# Patient Record
Sex: MALE | Race: WHITE | Employment: FULL TIME | URBAN - NONMETROPOLITAN AREA
[De-identification: names, ages, dates, MRNs, and addresses within clinical notes are randomized per-mention and may not be internally consistent; named-entity substitution may affect disease eponyms.]

---

## 2019-05-10 ENCOUNTER — HOSPITAL ENCOUNTER (OUTPATIENT)
Dept: PHYSICAL THERAPY | Age: 44
Setting detail: THERAPIES SERIES
Discharge: HOME | End: 2019-05-10
Payer: COMMERCIAL

## 2019-05-10 PROCEDURE — 97140 MANUAL THERAPY 1/> REGIONS: CPT

## 2019-05-10 PROCEDURE — 97110 THERAPEUTIC EXERCISES: CPT

## 2019-05-10 PROCEDURE — 97161 PT EVAL LOW COMPLEX 20 MIN: CPT

## 2019-05-10 ASSESSMENT — PAIN SCALES - QUEBEC BACK PAIN DISABILITY SCALE
TURN OVER IN BED: 2
STAND UP FOR 20 TO 30 MINUTES: 3
BEND OVER TO CLEAN THE BATHTUB: 4
SIT IN A CHAIR FOR SEVERAL HOURS: 3
MAKE YOUR BED: 4
QUEBEC CMS MODIFIER: CK
CLIMB ONE FLIGHT OF STAIRS: 2
MOVE A CHAIR: 3
LIFT AND CARRY A HEAVY SUITCASE: 4
TOTAL SCORE: 58
PULL OR PUSH HEAVY DOORS: 3
QUEBEC DISABILITY INDEX: 40-59%
THROW A BALL: 4
WALK A FEW BLOCKS OR 300 TO 400M: 2
SLEEP THROUGH THE NIGHT: 0
CARRY TWO BAGS OF GROCERIES: 3
PUT ON SOCKS OR PANYHOSE: 3
REACH UP TO HIGH SHELVES: 2
TAKE FOOD OUT OF THE REFRIGERATOR: 3
WALK SEVERAL KILOMETERS  OR MILES: 4
GET OUT OF BED: 2
RIDE IN A CAR: 2
RUN ONE BLOCK OR 100M: 5

## 2019-05-10 NOTE — PROGRESS NOTES
Phone: [address] [facility]          Fax: [phone]                      Outpatient Physical Therapy                                                                      Evaluation  Date: 5/10/2019  Patient: Sarah Spangler  : 1975  CSN #: [id number]  Referring Practitioner: Dr. Barb Black MD    Referral Date : 19     Diagnosis: Acute midline low back pain with bilateral sciatica, M54.42, M54.41    Treatment Diagnosis: Low back pain  Onset Date: 19  PT Insurance Information: BCBS  Total # of Visits Approved: 12   Total # of Visits to Date: 1  No Show: 0  Canceled Appointment: 0     Subjective  Subjective: Pt. reports he stood up Tuesday morning and had back pain with shooting pain on both sides of the hips 6/10 at worst. Pt. reports ibuprofen did not help with the pain but muscle relaxer helped. Pt. reports he has to do a lot of walking and stairs but no heavy lifting at work and is off work until 19. Comments: Expected RTW: 19. Additional Pertinent Hx: depression             Objective     Observation/Palpation  Posture: Fair  Palpation: TTP L4-5 spinous process and L lumbar paraspinals and L piriformis  Observation: Pt. stands with increased lumbar lordosis and decreased thoracic kyphosis     RLE General PROM: Hip IR: 45* no pain  LLE General PROM: Hip IR: 35* with pain    Spine  Lumbar: flexion: able to touch toes with pain on return; L SB: 2in from knee with pain, RSB: able with no pain           Strength RLE  Comment: 4+/5 hip flexion/abd/add; 5/5 knee flex/ext; ankle DF: 5/5  Strength LLE  Comment: 4+/5 hip flexion/abd/add; 5/5 knee flex/ext; ankle DF: 5/5  Strength Other  Other: Abdominal strength: 4/5    Additional Measures  Special Tests: (+) B slump test; (-) B DEBO and SLR test; limited B HS mobility SLR: 75* with pain; good pelvic alignment;      Functional Outcome Measures  Get out of bed: Somewhat difficult  Sleep through the night: Not difficult at all  Turn over in bed: Somewhat difficult  Ride in a car: Somewhat difficult  Stand up for 20-30 minutes: Fairly difficult  Sit in a chair for several hours: Fairly difficult  Climb one flight of stairs: Somewhat difficult  Walk a few blocks (300-400 m): Somewhat difficult  Walk several kilometers - miles: Very difficult  Reach up to high shelves: Somewhat difficult  Throw a ball: Very difficult  Run one block (about 100 m): Unable to do  Take food out of the refrigerator: Fairly difficult  Make your bed: Very difficult  Put on socks (pantyhose): Fairly difficult  Bend over to clean the bathtub: Very difficult  Move a chair: Fairly difficult  Pull or push heavy doors: Fairly difficult  Carry two bags of groceries: Fairly difficult  Lift and carry a heavy suitcase: Very difficult  Karis Total Score: 58      Assessment  Assessment: Pt. is 38year old male with dx of acute midline low back pain who presents with increased pain 6/10 at worst with occasional sharp shooting pain with extension, LSB and standing up from sitting. Pt. stands with increased lumbar lordosis and decreased thoracic kyphosis. TTP L4-5 spinous process and L lumbar paraspinals and L piriformis. Pt. has decreased L hip IR: 35* with pain and decreased lumbar AROM: flexion: able to touch toes with pain on return; L SB: 2in from knee with pain, RSB: able with no pain. Pt. has decreased abdominal strength: 4/5 and decreased B hip strength 4+/5. (+) B slump test; (-) B DEBO and SLR test; limited B HS mobility SLR: 75* with pain; good pelvic alignment. Pt. to benefit from physical therapy to improve lumbar AROM and L hip ROM, increased abdominal strength and improve posture to decrease pain and return to PLOF.    Prognosis: Good        Decision Making: Low Complexity    Patient Education  Patient Education: PT eval, POC, HEP  Pt verbalized/demonstrated good understanding:     [X] Yes         [] No, pt required further clarification. Goals  Short term goals  Time Frame for Short term goals: 3 weeks  Short term goal 1: Pt. to initiate HEP for improved core strength and lumbar mobility  Short term goal 2: Initiate modalities prn to decrease pain and improve mobility  Short term goal 3: Pt. to have improved L hip IR to 45* and be (-)TTP at L piriformis for improved mobility. Long term goals  Time Frame for Long term goals : 6 weeks  Long term goal 1: Pt. to be independent with HEP  Long term goal 2: Pt. to demo improved core strength >/=4+/5 and improved B hip strength to 5/5 for improved functional strength. Long term goal 3: Pt. to demo improved lumbar AROM: LSB able to reach knee joint line with no pain and flexion able to touch floor with no pain to improve functional mobility      Patient goals :  \"To fix the issue\"        Minutes Tracking:  Time In: 1018  Time Out: 1104  Minutes: [address], PT, DPT   5/10/2019

## 2019-05-10 NOTE — PLAN OF CARE
PT/OT INITIAL EVALUATION REPORT   Todays Date: 5/10/2019                PT Insurance Information: [employer]  Patient Name: Robert Mi                                  Patient ID #: _______________   YOB: 1975 Age: 38 y.o. ICD-10 Code with Diagnosis: Acute midline low back pain with bilateral sciatica, M54.42, M54.41  Referring Practitioner: Dr. Karlie Cardenas MD  Referring Physician ID #:_______________     Therapy Office: [address]          Discipline: []PT  []OT     Condition Type: [x] Acute(< 2 months)  [] Sub-acute(2-3 months)  [] Chronic(>3 months)    Onset:  [x] Insidious/No Trauma   [] Traumatic Injury   [] Repetitive Stress               [] Post-Operative (Type: Date:)  [] Work-related   [] Motor Vehicle     How / Where Injury Occurred: Subjective: Pt. reports he stood up Tuesday morning and had back pain with shooting pain on both sides of the hips 6/10 at worst. Pt. reports ibuprofen did not help with the pain but muscle relaxer helped. Pt. reports he has to do a lot of walking and stairs but no heavy lifting at work and is off work until 5/20/19.       Pertinent History: Additional Pertinent Hx: depression    Pain:  Pain Scale:  6 /10     Nature: [] constant   [x] intermittent   [x] localized   [] radiating     OBJECTIVE FINDINGS    Involved Region: [x]Left / []Right / []N/A     AROM Limitations: [] None   [] Minimal   [x] Moderate   [] Significant    Strength ( 0-5 )           Strength RLE  Comment: 4+/5 hip flexion/abd/add; 5/5 knee flex/ext; ankle DF: 5/5      Strength LLE  Comment: 4+/5 hip flexion/abd/add; 5/5 knee flex/ext; ankle DF: 5/5        Strength Other  Other: Abdominal strength: 4/5    Functional Limitations:  [] None   [] Minimal   [x] Moderate   [] Significant    Additional Information: Assessment: Pt. is 38year old male with dx of acute midline low back pain who presents with increased pain 6/10 at worst with occasional sharp shooting pain with extension, LSB and standing up from sitting. Pt. stands with increased lumbar lordosis and decreased thoracic kyphosis. TTP L4-5 spinous process and L lumbar paraspinals and L piriformis. Pt. has decreased L hip IR: 35* with pain and decreased lumbar AROM: flexion: able to touch toes with pain on return; L SB: 2in from knee with pain, RSB: able with no pain. Pt. has decreased abdominal strength: 4/5 and decreased B hip strength 4+/5. (+) B slump test; (-) B DEBO and SLR test; limited B HS mobility SLR: 75* with pain; good pelvic alignment. Pt. to benefit from physical therapy to improve lumbar AROM and L hip ROM, increased abdominal strength and improve posture to decrease pain and return to PLOF. Functional Measure Score: Form Type: [] Neck    [] Back   [] SF 12/36   [] LEFS   [] DASH   [] KSS    [x] Other: [place]: 58    Specific Treatment Plan:  [x]HP/CP      [x]Electrical Stim   [x]Therapeutic Exercise      []Gait Training  []Aquatics   []Ultrasound         [x]Patient Education/HEP   [x]Manual Therapy  [x]Traction    [x]Neuro-susie        [x]Soft Tissue Mobs            []Home TENS  []Iontophoresis    []Orthotic casting/fitting      []Dry Needling       Treatment Goals:   Short term goals  Time Frame for Short term goals: 3 weeks  Short term goal 1: Pt. to initiate HEP for improved core strength and lumbar mobility  Short term goal 2: Initiate modalities prn to decrease pain and improve mobility  Short term goal 3: Pt. to have improved L hip IR to 45* and be (-)TTP at L piriformis for improved mobility. Long term goals  Time Frame for Long term goals : 6 weeks  Long term goal 1: Pt. to be independent with HEP  Long term goal 2: Pt. to demo improved core strength >/=4+/5 and improved B hip strength to 5/5 for improved functional strength.    Long term goal 3: Pt. to demo improved lumbar AROM: LSB able to reach knee joint line with no pain and flexion able to touch floor with no pain to improve functional mobility    Projected Frequency / Duration of Treatment   Times per week: 2-3  Plan weeks: 4    Progress since first visit:   [x] None, 1st visit    [] No Progress Yet    [] Some Progress     [] Significant Progress    [] Significantly Worse     [] Extension of Services     Therapist Signature:           Printed Therapist Name and License #:Ritesh Donato, PT, DPT    Copyright 2018 [employer]      Created: 9/99 / Revised: 04/2018

## 2019-05-10 NOTE — PLAN OF CARE
mobility. Long term goals  Time Frame for Long term goals : 6 weeks  Long term goal 1: Pt. to be independent with HEP  Long term goal 2: Pt. to demo improved core strength >/=4+/5 and improved B hip strength to 5/5 for improved functional strength.    Long term goal 3: Pt. to demo improved lumbar AROM: LSB able to reach knee joint line with no pain and flexion able to touch floor with no pain to improve functional mobility     Prognosis  Prognosis: Good    Treatment Plan   Times per week: 2-3  Plan weeks: 4  [x] HP/CP      [x] Electrical Stim   [x] Therapeutic Exercise      [] Gait Training  [] Aquatics   [] Ultrasound         [x] Patient Education/HEP   [x] Manual Therapy  [x] Traction    [x] Neuro-susie        [x] Soft Tissue Mobs            [] Home TENS  [] Iontophoresis    [] Orthotic casting/fitting      [] Dry Needling             Electronically signed by: Roni Pearson PT, DPT    Date: 5/10/2019      ______________________________________ Date: 5/10/2019   Physician Signature

## 2019-05-16 ENCOUNTER — HOSPITAL ENCOUNTER (OUTPATIENT)
Dept: PHYSICAL THERAPY | Age: 44
Setting detail: THERAPIES SERIES
Discharge: HOME | End: 2019-05-16
Payer: COMMERCIAL

## 2019-05-16 PROCEDURE — G0283 ELEC STIM OTHER THAN WOUND: HCPCS

## 2019-05-16 PROCEDURE — 97110 THERAPEUTIC EXERCISES: CPT

## 2019-05-16 NOTE — PROGRESS NOTES
Phone: [facility]           Fax: [phone]                           Outpatient Physical Therapy                                                                            Daily Note    Patient: Telma Mariee : 1975  CSN #: [id number]   Referring Practitioner:  Dr. Francisco Sarabia MD    Referral Date : 19     Date: 2019    Diagnosis: Acute midline low back pain with bilateral sciatica, M54.42, M54.41  Treatment Diagnosis: Low back pain    Onset Date: 19  PT Insurance Information: BCBS  Total # of Visits Approved: 12 Per Physician Order  Total # of Visits to Date: 2  No Show: 0  Canceled Appointment: 0      Pre-Treatment Pain:  4/10  Subjective: States pain has decreased. Reports 4/10 pain. Exercises:  Exercise 1: HEP: LTR, SKTC, DKTC, PPT, bridges, supine and sitting piriformis stretches  Modalities:  Moist heat: 15 min with ifc   E-stim (parameters): ifc for pain        Assessment  Assessment: Feelsing some better. Patient with pain during left SB and some mild pain coming out of flexion. LB \"catches\" wt time while performing a sit to stand transfer. Flexion based exercise provide relief. Had pain with stiff leg swiss ball bridging. Added ifc/hp post exercise     Patient Education  Patient Education: HEP and LB precautions   Pt verbalized/demonstrated good understanding:     [x] Yes         [] No, pt required further clarification.     Post Treatment Pain:  4/10      Plan  Times per week: 2-3  Plan weeks: 4      Goals  (Total # of Visits to Date: 2)   Short Term Goals - Time Frame for Short term goals: 3 weeks     Short term goal 1: Pt. to initiate HEP for improved core strength and lumbar mobility                                        []Met   []Partially met  []Not met   Short term goal 2: Initiate modalities prn to decrease pain and improve mobility  []Met   []Partially met  []Not met   Short term goal 3: Pt. to have improved L hip IR to 45* and be (-)TTP at L piriformis for improved mobility. []Met  []Partially met  []Not met      []Met   []Partially met  []Not met     Long Term Goals - Time Frame for Long term goals : 6 weeks  Long term goal 1: Pt. to be independent with HEP []Met  []Partially met  []Not met   Long term goal 2: Pt. to demo improved core strength >/=4+/5 and improved B hip strength to 5/5 for improved functional strength.   []Met  []Partially met  []Not met   Long term goal 3: Pt. to demo improved lumbar AROM: LSB able to reach knee joint line with no pain and flexion able to touch floor with no pain to improve functional mobility []Met  []Partially met  []Not met     []Met  []Partially met  []Not met     []Met  []Partially met  []Not met       Minutes Tracking:  Time In: 1700  Time Out: [address]  Minutes: 55    Omari German PTA     Date: 5/16/2019

## 2019-05-17 ENCOUNTER — HOSPITAL ENCOUNTER (OUTPATIENT)
Dept: PHYSICAL THERAPY | Age: 44
Setting detail: THERAPIES SERIES
Discharge: HOME | End: 2019-05-17
Payer: COMMERCIAL

## 2019-05-17 PROCEDURE — G0283 ELEC STIM OTHER THAN WOUND: HCPCS

## 2019-05-17 PROCEDURE — 97110 THERAPEUTIC EXERCISES: CPT

## 2019-05-17 PROCEDURE — 97140 MANUAL THERAPY 1/> REGIONS: CPT

## 2019-05-17 NOTE — PROGRESS NOTES
Phone: [facility]           Fax: [phone]                           Outpatient Physical Therapy                                                                            Daily Note    Patient: Robert Mi : 1975  CSN #: [id number]   Referring Practitioner:  Dr. Karlie Cardenas MD    Referral Date : 19     Date: 2019    Diagnosis: Acute midline low back pain with bilateral sciatica, M54.42, M54.41  Treatment Diagnosis: Low back pain    Onset Date: 19  PT Insurance Information: BCBS  Total # of Visits Approved: 12 Per Physician Order  Total # of Visits to Date: 3  No Show: 0  Canceled Appointment: 0      Pre-Treatment Pain:  3/10  Subjective: Pt states pain level 3/10 upon arrival to therapy this date. Pt states he is more sore from yesterday's treatment. Exercises:  Exercise 1: HEP: LTR, SKTC, DKTC, PPT, bridges, supine and sitting piriformis stretches    Manual:  Muscle energy: MET for L posterior rotation  Soft Tissue Mobalization: Thermoprobe L low back/SIJ    Modalities:  Moist heat: 15 min with ifc   E-stim (parameters): ifc for pain        Assessment  Assessment: Pt demonstrates L posterior rotation this date, tested with supine to long sit test. MET performed for correction. Thermoprobe performed to L-side SIJ/low back to assist wtih decreasing pain; however, pt very TTP in L PSIS and L superior SIJ, which limits amount of pressure placed for STM. Reviewed HEP this date. Will cont to progress as tolerated. Patient Education  Educated on MET for pelvic alignment. Pt verbalized/demonstrated good understanding:     [x] Yes         [] No, pt required further clarification.     Post Treatment Pain:  3/10      Plan  Times per week: 2-3  Plan weeks: 4      Goals  (Total # of Visits to Date: 3)   Short Term Goals - Time Frame for Short term goals: 3 weeks     Short term goal 1: Pt. to initiate HEP for improved core strength and lumbar mobility- met                                        []Met   []Partially met  []Not met   Short term goal 2: Initiate modalities prn to decrease pain and improve mobility- met  []Met   []Partially met  []Not met   Short term goal 3: Pt. to have improved L hip IR to 45* and be (-)TTP at L piriformis for improved mobility. []Met   []Partially met  []Not met      []Met   []Partially met  []Not met     Long Term Goals - Time Frame for Long term goals : 6 weeks  Long term goal 1: Pt. to be independent with HEP []Met  []Partially met  []Not met   Long term goal 2: Pt. to demo improved core strength >/=4+/5 and improved B hip strength to 5/5 for improved functional strength.   []Met  []Partially met  []Not met   Long term goal 3: Pt. to demo improved lumbar AROM: LSB able to reach knee joint line with no pain and flexion able to touch floor with no pain to improve functional mobility []Met  []Partially met  []Not met     []Met  []Partially met  []Not met     []Met  []Partially met  []Not met       Minutes Tracking:  Time In: 0920  Time Out: 1020  Minutes: 60    Cordelia Rubio, DPT      Date: 5/17/2019

## 2019-05-21 ENCOUNTER — HOSPITAL ENCOUNTER (OUTPATIENT)
Dept: PHYSICAL THERAPY | Age: 44
Setting detail: THERAPIES SERIES
Discharge: HOME | End: 2019-05-21
Payer: COMMERCIAL

## 2019-05-21 PROCEDURE — 97032 APPL MODALITY 1+ESTIM EA 15: CPT

## 2019-05-21 PROCEDURE — 97140 MANUAL THERAPY 1/> REGIONS: CPT

## 2019-05-21 PROCEDURE — 97110 THERAPEUTIC EXERCISES: CPT

## 2019-05-21 NOTE — PROGRESS NOTES
Phone: [facility]           Fax: [phone]                           Outpatient Physical Therapy                                                                            Daily Note    Patient: Telma Mariee : 1975  CSN #: [id number]   Referring Practitioner:  Dr. Francisco Sarabia MD    Referral Date : 19     Date: 2019    Diagnosis: Acute midline low back pain with bilateral sciatica, M54.42, M54.41  Treatment Diagnosis: Low back pain    Onset Date: 19  PT Insurance Information: BCBS  Total # of Visits Approved: 12 Per Physician Order  Total # of Visits to Date: 4  No Show: 0  Canceled Appointment: 0      Pre-Treatment Pain:  3/10  Subjective: States pain is about the same . A little less catching with sit to standing transfer     Exercises:  Exercise 1: HEP: LTR, SKTC, DKTC, PPT, bridges, supine and sitting piriformis stretches    Manual:  Muscle energy: MET for L posterior rotation  Soft Tissue Mobalization: Thermoprobe L low back/SIJ    Modalities:  Moist heat: 15 min with ifc   E-stim (parameters): ifc for pain        Assessment  Assessment: ME to correct left post roration of illium. Thermoprobe tolerated a little better today. Core ex and hep reviewed. Patient Education  Patient Education: HEP and LB precautions   Pt verbalized/demonstrated good understanding:     [x] Yes         [] No, pt required further clarification.     Post Treatment Pain:  /10      Plan  Times per week: 2-3  Plan weeks: 4      Goals  (Total # of Visits to Date: 4)   Short Term Goals - Time Frame for Short term goals: 3 weeks     Short term goal 1: Pt. to initiate HEP for improved core strength and lumbar mobility- met                                        []Met   []Partially met  []Not met   Short term goal 2: Initiate modalities prn to decrease pain and improve mobility- met  []Met   []Partially met  []Not met   Short term goal 3: Pt. to have improved L hip IR to 45* and

## 2019-05-23 ENCOUNTER — HOSPITAL ENCOUNTER (OUTPATIENT)
Dept: PHYSICAL THERAPY | Age: 44
Setting detail: THERAPIES SERIES
Discharge: HOME | End: 2019-05-23
Payer: COMMERCIAL

## 2019-05-23 PROCEDURE — 97110 THERAPEUTIC EXERCISES: CPT

## 2019-05-23 PROCEDURE — G0283 ELEC STIM OTHER THAN WOUND: HCPCS

## 2019-05-23 ASSESSMENT — PAIN SCALES - GENERAL: PAINLEVEL_OUTOF10: 3

## 2019-05-23 NOTE — PROGRESS NOTES
Phone: [facility]           Fax: [phone]                           Outpatient Physical Therapy                                                                            Daily Note    Patient: Carol Whyte : 1975  CSN #: [id number]   Referring Practitioner:  Dr. Diane Michael MD    Referral Date : 19     Date: 2019    Diagnosis: Acute midline low back pain with bilateral sciatica, M54.42, M54.41  Treatment Diagnosis: Low back pain    Onset Date: 19  PT Insurance Information: BCBS  Total # of Visits Approved: 12 Per Physician Order  Total # of Visits to Date: 5  No Show: 0  Canceled Appointment: 0      Pre-Treatment Pain:  3/10  Subjective: Feels  a little better, only took 1 ibupropfen for pain today     Exercises:  Exercise 2: purple ball --DKTC 15x2  Exercise 3: LTR 20x2  Exercise 4: Bridging 20x2   Exercise 5: prone hip extension and knee curls     Manual:       Modalities:  Moist heat: 15 min with ifc   E-stim (parameters): ifc for pain        Assessment  Assessment: Alignment looked god today ME not needed. Progressed to basic core exercise with good tolerance. IFC/hp end of session     Patient Education  Patient Education: HEP and LB precautions   Pt verbalized/demonstrated good understanding:     [x] Yes         [] No, pt required further clarification. Post Treatment Pain:  3/10      Plan  Times per week: 2-3         Goals  (Total # of Visits to Date: 5)   Short Term Goals - Time Frame for Short term goals: 3 weeks     Short term goal 1: Pt. to initiate HEP for improved core strength and lumbar mobility- met                                        []Met   []Partially met  []Not met   Short term goal 2: Initiate modalities prn to decrease pain and improve mobility- met  []Met   []Partially met  []Not met   Short term goal 3: Pt. to have improved L hip IR to 45* and be (-)TTP at L piriformis for improved mobility.   []Met   []Partially met  []Not met      []Met   []Partially met  []Not met     Long Term Goals - Time Frame for Long term goals : 6 weeks  Long term goal 1: Pt. to be independent with HEP []Met  []Partially met  []Not met   Long term goal 2: Pt. to demo improved core strength >/=4+/5 and improved B hip strength to 5/5 for improved functional strength.   []Met  []Partially met  []Not met   Long term goal 3: Pt. to demo improved lumbar AROM: LSB able to reach knee joint line with no pain and flexion able to touch floor with no pain to improve functional mobility []Met  []Partially met  []Not met     []Met  []Partially met  []Not met     []Met  []Partially met  []Not met       Minutes Tracking:  Time In: 1615  Time Out: Mohit 91  Minutes: 62    Omari German PTA     Date: 5/23/2019

## 2019-05-28 ENCOUNTER — HOSPITAL ENCOUNTER (OUTPATIENT)
Dept: PHYSICAL THERAPY | Age: 44
Setting detail: THERAPIES SERIES
Discharge: HOME | End: 2019-05-28
Payer: COMMERCIAL

## 2019-05-28 PROCEDURE — 97140 MANUAL THERAPY 1/> REGIONS: CPT

## 2019-05-28 PROCEDURE — 97110 THERAPEUTIC EXERCISES: CPT

## 2019-05-28 PROCEDURE — G0283 ELEC STIM OTHER THAN WOUND: HCPCS

## 2019-05-29 NOTE — PROGRESS NOTES
Phone: [facility]           Fax: [phone]                           Outpatient Physical Therapy                                                                            Daily Note    Patient: Jazmine Rodriguez : 1975  CSN #: [id number]   Referring Practitioner:  Dr. Louis Coello MD    Referral Date : 19     Date: 2019    Diagnosis: Acute midline low back pain with bilateral sciatica, M54.42, M54.41  Treatment Diagnosis: Low back pain    Onset Date: 19  PT Insurance Information: BCBS  Total # of Visits Approved: 12 Per Physician Order  Total # of Visits to Date: 6  No Show: 0  Canceled Appointment: 0      Pre-Treatment Pain:  3/10  Subjective: Pt eports he \"really doesnt feel like hes made a ton of improvement so far\". Pt states he continues to get stabbingtype LBP in mid lower back. Pt rates current pain a 3/10. Exercises:  Exercise 1: HEP: LTR, SKTC, DKTC, PPT, bridges, supine and sitting piriformis stretches  Exercise 4: Bridging 20x2   Exercise 5: prone hip extension and knee curls   Exercise 6: KENNY 5 mins/ prone press ups 15x    Manual:  Muscle energy: MET for L posterior rotation  Manual traction: Lumbar with therapy ball in supine (increased pain noted)-gentle today  Soft Tissue Mobalization: Thermoprobe L low back/SIJ    Modalities:  Moist heat: 15 min with ifc   E-stim (parameters): ifc for pain        Assessment  Assessment: Pt required correction today for hip alignment. focused on more ext based ex with no radicular symptoms noted. Will advance as Pt tolerates. Patient Education  Patient Education: New ex rationale. Pt verbalized/demonstrated good understanding:     [x] Yes         [] No, pt required further clarification.     Post Treatment Pain:  3/10      Plan  Times per week: 2-3  Plan weeks: 4      Goals  (Total # of Visits to Date: 6)   Short Term Goals - Time Frame for Short term goals: 3 weeks     Short term goal 1: Pt. to

## 2019-05-30 ENCOUNTER — HOSPITAL ENCOUNTER (OUTPATIENT)
Dept: PHYSICAL THERAPY | Age: 44
Setting detail: THERAPIES SERIES
Discharge: HOME | End: 2019-05-30
Payer: COMMERCIAL

## 2019-05-30 PROCEDURE — 97110 THERAPEUTIC EXERCISES: CPT

## 2019-05-30 PROCEDURE — G0283 ELEC STIM OTHER THAN WOUND: HCPCS

## 2019-05-30 PROCEDURE — 97012 MECHANICAL TRACTION THERAPY: CPT

## 2019-05-30 NOTE — PROGRESS NOTES
Phone: [facility]           Fax: [phone]                           Outpatient Physical Therapy                                                                            Daily Note    Patient: Liam Loco : 1975  CSN #: [id number]   Referring Practitioner:  Dr. Lisy Palomo MD    Referral Date : 19     Date: 2019    Diagnosis: Acute midline low back pain with bilateral sciatica, M54.42, M54.41  Treatment Diagnosis: Low back pain    Onset Date: 19  PT Insurance Information: BCBS  Total # of Visits Approved: 12 Per Physician Order  Total # of Visits to Date: 7  No Show: 0  Canceled Appointment: 0      Pre-Treatment Pain:  4/10  Subjective: Felt a little better following last session. States pain remain 3-4/10    Exercises:  Exercise 5: prone hip extension and knee curls   Exercise 6: KENNY 5 mins/ prone press ups 15x    Modalities:  Moist heat: 15 min with ifc   Lumbar traction: IPT  85/55. 45 sec hold, 15 sec rest.        Assessment  Assessment: Aliognment looks good. Pt with increased pain with right SB>left. Pain not as bad with right post quadrant position. Initiated IPT per PT, pt with fair/good tolerance. Increase pull as tolerated     Patient Education  Patient Education: Addition of IPT  Pt verbalized/demonstrated good understanding:     [x] Yes         [] No, pt required further clarification.     Post Treatment Pain:  3/10      Plan  Times per week: 2-3  Plan weeks: 4      Goals  (Total # of Visits to Date: 7)   Short Term Goals - Time Frame for Short term goals: 3 weeks     Short term goal 1: Pt. to initiate HEP for improved core strength and lumbar mobility- met                                        []Met   []Partially met  []Not met   Short term goal 2: Initiate modalities prn to decrease pain and improve mobility- met  []Met   []Partially met  []Not met   Short term goal 3: Pt. to have improved L hip IR to 45* and be (-)TTP at L piriformis

## 2019-06-04 ENCOUNTER — HOSPITAL ENCOUNTER (OUTPATIENT)
Dept: PHYSICAL THERAPY | Age: 44
Setting detail: THERAPIES SERIES
Discharge: HOME | End: 2019-06-04
Payer: COMMERCIAL

## 2019-06-04 PROCEDURE — 97032 APPL MODALITY 1+ESTIM EA 15: CPT

## 2019-06-04 PROCEDURE — 97110 THERAPEUTIC EXERCISES: CPT

## 2019-06-04 PROCEDURE — 97012 MECHANICAL TRACTION THERAPY: CPT

## 2019-06-04 NOTE — PROGRESS NOTES
Phone: [facility]           Fax: [phone]                           Outpatient Physical Therapy                                                                            Daily Note    Patient: Parminder Ruiz : 1975  CSN #: [id number]   Referring Practitioner:  Dr. Kurt Sterling MD    Referral Date : 19     Date: 2019    Diagnosis: Acute midline low back pain with bilateral sciatica, M54.42, M54.41  Treatment Diagnosis: Low back pain    Onset Date: 19  PT Insurance Information: BCBS  Total # of Visits Approved: 12 Per Physician Order  Total # of Visits to Date: 8  No Show: 0  Canceled Appointment: 0      Pre-Treatment Pain:  3/10  Subjective:  States pain remain 3-4/10 States he felt better following the last traction visit. Reports he had very little pain on monday but got active and pain was back by the evening. Current pain is 3/10     Exercises:  Exercise 5: prone hip extension and knee curls   Exercise 6: KENNY 5 mins/ prone press ups 15x2    Modalities:  Moist heat: 15 min with ifc   E-stim (parameters): ifc for pain   Lumbar traction: IPT  95/55. 45 sec hold, 15 sec rest.        Assessment  Assessment: Prone exercise with no increase in sxs. Progressed traction pull to 95# with good tolerance. Noticed pt was ableMild discomfort continue with right post quadrant test  to transition out of chair to standing position with no pain today. Patient Education  Patient Education: Progression of IPT pull   Pt verbalized/demonstrated good understanding:     [x] Yes         [] No, pt required further clarification.     Post Treatment Pain:  3/10      Plan  Times per week: 2-3  Plan weeks: 4      Goals  (Total # of Visits to Date: 8)   Short Term Goals - Time Frame for Short term goals: 3 weeks     Short term goal 1: Pt. to initiate HEP for improved core strength and lumbar mobility- met                                        []Met   []Partially met  []Not met Short term goal 2: Initiate modalities prn to decrease pain and improve mobility- met  []Met   []Partially met  []Not met   Short term goal 3: Pt. to have improved L hip IR to 45* and be (-)TTP at L piriformis for improved mobility. []Met   []Partially met  []Not met      []Met   []Partially met  []Not met     Long Term Goals - Time Frame for Long term goals : 6 weeks  Long term goal 1: Pt. to be independent with HEP []Met  []Partially met  []Not met   Long term goal 2: Pt. to demo improved core strength >/=4+/5 and improved B hip strength to 5/5 for improved functional strength.   []Met  []Partially met  []Not met   Long term goal 3: Pt. to demo improved lumbar AROM: LSB able to reach knee joint line with no pain and flexion able to touch floor with no pain to improve functional mobility []Met  []Partially met  []Not met     []Met  []Partially met  []Not met     []Met  []Partially met  []Not met       Minutes Tracking:  Time In: 1700  Time Out: 1803  Minutes: 63    Omari Duke     Date: 6/4/2019

## 2019-06-05 ENCOUNTER — HOSPITAL ENCOUNTER (OUTPATIENT)
Dept: MRI IMAGING | Age: 44
Discharge: HOME | End: 2019-06-07
Payer: COMMERCIAL

## 2019-06-05 DIAGNOSIS — M54.42 ACUTE MIDLINE LOW BACK PAIN WITH LEFT-SIDED SCIATICA: ICD-10-CM

## 2019-06-05 PROCEDURE — 72148 MRI LUMBAR SPINE W/O DYE: CPT

## 2019-06-06 ENCOUNTER — HOSPITAL ENCOUNTER (OUTPATIENT)
Dept: PHYSICAL THERAPY | Age: 44
Setting detail: THERAPIES SERIES
Discharge: HOME | End: 2019-06-06
Payer: COMMERCIAL

## 2019-06-06 PROCEDURE — G0283 ELEC STIM OTHER THAN WOUND: HCPCS

## 2019-06-06 PROCEDURE — 97012 MECHANICAL TRACTION THERAPY: CPT

## 2019-06-06 ASSESSMENT — PAIN SCALES - GENERAL: PAINLEVEL_OUTOF10: 3

## 2019-06-06 NOTE — PROGRESS NOTES
Phone: [facility]           Fax: [phone]                           Outpatient Physical Therapy                                                                            Daily Note    Patient: Black Harris : 1975  CSN #: [id number]   Referring Practitioner:  Dr. Mayra Cantor MD    Referral Date : 19     Date: 2019    Diagnosis: Acute midline low back pain with bilateral sciatica, M54.42, M54.41  Treatment Diagnosis: Low back pain    Onset Date: 19  PT Insurance Information: BCBS  Total # of Visits Approved: 12 Per Physician Order  Total # of Visits to Date: 9  No Show: 0  Canceled Appointment: 0      Pre-Treatment Pain:  3/10  Subjective: Had MRI, herniated L5-S1 per report. Reports pain has been decreasing with treatment and traction     Exercises:  Exercise 5: prone hip extension and knee curls   Exercise 6: KENNY 5 mins/ prone press ups 15x2       Modalities:  Moist heat: 15 min with ifc   E-stim (parameters): ifc for pain   Lumbar traction: IPT  100/55. 45 sec hold, 15 sec rest.        Assessment  Assessment: Pain2-3/10, pt states sxs are decreasing with treatment. No radicular sxs reported. Neg myotome. Trunk flexion and extension is slightly guarded. Note less pain with SBing. Pt would like to cont with a few more sessions before he returns to the Dr.    Patient Education  Patient Education: Progression of IPT pull   Pt verbalized/demonstrated good understanding:     [x] Yes         [] No, pt required further clarification.     Post Treatment Pain:  2/10      Plan  Times per week: 2-3         Goals  (Total # of Visits to Date: 5)   Short Term Goals -       Short term goal 1: Pt. to initiate HEP for improved core strength and lumbar mobility- met                                        []Met   []Partially met  []Not met   Short term goal 2: Initiate modalities prn to decrease pain and improve mobility- met  []Met   []Partially met  []Not met   Short term goal 3: Pt. to have improved L hip IR to 45* and be (-)TTP at L piriformis for improved mobility. []Met   []Partially met  []Not met      []Met   []Partially met  []Not met     Long Term Goals - Time Frame for Long term goals : 6 weeks  Long term goal 1: Pt. to be independent with HEP []Met  []Partially met  []Not met   Long term goal 2: Pt. to demo improved core strength >/=4+/5 and improved B hip strength to 5/5 for improved functional strength.   []Met  []Partially met  []Not met   Long term goal 3: Pt. to demo improved lumbar AROM: LSB able to reach knee joint line with no pain and flexion able to touch floor with no pain to improve functional mobility []Met  []Partially met  []Not met     []Met  []Partially met  []Not met     []Met  []Partially met  []Not met       Minutes Tracking:  Time In: 66 91 21  Time Out: Mindy  Minutes: 62    Omari German PTA     Date: 6/6/2019

## 2019-06-11 ENCOUNTER — HOSPITAL ENCOUNTER (OUTPATIENT)
Dept: PHYSICAL THERAPY | Age: 44
Setting detail: THERAPIES SERIES
Discharge: HOME | End: 2019-06-11
Payer: COMMERCIAL

## 2019-06-11 PROCEDURE — 97012 MECHANICAL TRACTION THERAPY: CPT

## 2019-06-11 PROCEDURE — 97110 THERAPEUTIC EXERCISES: CPT

## 2019-06-11 NOTE — PLAN OF CARE
formerly [facility]           Phone: [phone]             Outpatient Physical Therapy  Fax: [phone]                                           Date: 2019  Patient: Stormy Avendaño : 1975 [facility] #: [id number]   Referring Practitioner:  Dr. Ileana Crawford MD Referral Date:  19       [] Plan of Care   [x] Updated Plan of Care    Dates of Service to Include: 2019 to 19    Diagnosis:  Acute midline low back pain with bilateral sciatica, M54.42, M54.41    Rehab (Treatment) Diagnosis:  Low back pain             Onset Date:  19    Attendance  Total # of Visits to Date: 9 No Show: 0 Canceled Appointment: 0    Assessment  Assessment: At last visit, patient had MRI revealing L5-S1 disc bulge with foraminal narrowing. Pt. reports decreased pain with therapy overall and would like to continue with traction treatments. Decreased pain with sidebending and no radicular symptoms reports. Will continue traction and strengthening in order to decrease pain and return to PLOF with reduced risk of reinjury. Goals  Short term goals  Time Frame for Short term goals: 3 weeks  Short term goal 1: Pt. to initiate HEP for improved core strength and lumbar mobility- met  Short term goal 2: Initiate modalities prn to decrease pain and improve mobility- met  Short term goal 3: Pt. to have improved L hip IR to 45* and be (-)TTP at L piriformis for improved mobility. Long term goals  Time Frame for Long term goals : 6 weeks  Long term goal 1: Pt. to be independent with HEP  Long term goal 2: Pt. to demo improved core strength >/=4+/5 and improved B hip strength to 5/5 for improved functional strength.    Long term goal 3: Pt. to demo improved lumbar AROM: LSB able to reach knee joint line with no pain and flexion able to touch floor with no pain to improve functional mobility     Prognosis  Prognosis: Good    Treatment Plan Times per week: 2-3  Plan weeks: 4  [x] HP/CP      [x] Electrical Stim   [x] Therapeutic Exercise      [] Gait Training  [] Aquatics   [] Ultrasound         [x] Patient Education/HEP   [x] Manual Therapy  [x] Traction    [x] Neuro-susie        [x] Soft Tissue Mobs            [] Home TENS  [] Iontophoresis    [] Orthotic casting/fitting      [] Dry Needling             Electronically signed by: Socorro Talley PT, DPT    Date: 6/11/2019      ______________________________________ Date: 6/11/2019   Physician Signature

## 2019-06-11 NOTE — PROGRESS NOTES
Phone: [facility]           Fax: [phone]                           Outpatient Physical Therapy                                                                            Daily Note    Patient: Honey Alonzo : 1975  CSN #: [id number]   Referring Practitioner:  Dr. Lion Dial MD    Referral Date : 19     Date: 2019    Diagnosis: Acute midline low back pain with bilateral sciatica, M54.42, M54.41  Treatment Diagnosis: Low back pain    Onset Date: 19  PT Insurance Information: BCBS  Total # of Visits Approved: 20 Per Physician Order  Total # of Visits to Date: 10  No Show: 0  Canceled Appointment: 0      Pre-Treatment Pain:  /10  Subjective: Pt states he's been feeling alot better the last 4 days. States pain is minimal to none today. Modalities:  Lumbar traction: IPT  100/55. 45 sec hold, 15 sec rest.        Assessment  Assessment: PCont with IPT this session. d/c ifc due to low pain. Pt with near normal trunk ROM following IPT with no sxs reported. Pt feels he can RTW and plans to the  on Friday. Pt has one session left on Thursday for re-eval.     Patient Education  Patient Education: HEP  Pt verbalized/demonstrated good understanding:     [x] Yes         [] No, pt required further clarification. Post Treatment Pain:  1/10      Plan  Times per week: 2-3  Plan weeks: 4      Goals  (Total # of Visits to Date: 10)   Short Term Goals - Time Frame for Short term goals: 3 weeks     Short term goal 1: Pt. to initiate HEP for improved core strength and lumbar mobility- met                                        []Met   []Partially met  []Not met   Short term goal 2: Initiate modalities prn to decrease pain and improve mobility- met  []Met   []Partially met  []Not met   Short term goal 3: Pt. to have improved L hip IR to 45* and be (-)TTP at L piriformis for improved mobility.   []Met   []Partially met  []Not met      []Met   []Partially met  []Not met     Long Term Goals - Time Frame for Long term goals : 6 weeks  Long term goal 1: Pt. to be independent with HEP []Met  []Partially met  []Not met   Long term goal 2: Pt. to demo improved core strength >/=4+/5 and improved B hip strength to 5/5 for improved functional strength.   []Met  []Partially met  []Not met     []Met  []Partially met  []Not met     []Met  []Partially met  []Not met     []Met  []Partially met  []Not met       Minutes Tracking:  Time In: 1700  Time Out: 1740  Minutes: 40    Mounika Duke     Date: 6/11/2019

## 2019-06-12 ENCOUNTER — HOSPITAL ENCOUNTER (OUTPATIENT)
Dept: PHYSICAL THERAPY | Age: 44
Setting detail: THERAPIES SERIES
Discharge: HOME | End: 2019-06-12
Payer: COMMERCIAL

## 2019-06-13 ENCOUNTER — HOSPITAL ENCOUNTER (OUTPATIENT)
Dept: PHYSICAL THERAPY | Age: 44
Setting detail: THERAPIES SERIES
Discharge: HOME | End: 2019-06-13
Payer: COMMERCIAL

## 2019-06-13 PROCEDURE — 97012 MECHANICAL TRACTION THERAPY: CPT

## 2019-06-13 PROCEDURE — 97110 THERAPEUTIC EXERCISES: CPT

## 2019-06-13 NOTE — PROGRESS NOTES
Phone: [facility]           Fax: [phone]                           Outpatient Physical Therapy                                                                            Daily Note    Patient: Юлия oDran : 1975  CSN #: [id number]   Referring Practitioner:  Dr. Miguel Sorensen MD    Referral Date : 19     Date: 2019    Diagnosis: Acute midline low back pain with bilateral sciatica, M54.42, M54.41  Treatment Diagnosis: Low back pain    Onset Date: 19  PT Insurance Information: BCBS  Total # of Visits Approved: 20 Per Physician Order  Total # of Visits to Date: 10  No Show: 0  Canceled Appointment: 0      Pre-Treatment Pain:  0/10  Subjective: Pt. states he feels 100% better and is ready to be done with therapy. Pt. would like to return to work with no restrictions. Exercises:  Exercise 7: Squats x5  Exercise 8: Bend and touch toes x5 ea way  Exercise 9: Lift and carry box 50# 25ftx2 with safe technique  Modalities:  Lumbar traction: IPT  100/55. 45 sec hold, 15 sec rest.        Assessment  Assessment: Pt. reports 100% improvement since beginning therapy with no pain at all in his back. Pt. has met all goals at this time for independence with HEP and decreased pain. Pt. has improved lumbar AROM flexion: able to reach floor with no pain and B SB able to reach B knee joint lines with no pain meeting long term goal. Pt. demo's normal B hip strength and is able to squat and lift and carry 50# with no pain and demo's 4+/5 abdominal strength meeting long term goal for strength. Pt. has met all requirements on matrix he was given in order to return to work with no restrictions. Will place patient on hold at this time and d/c as appropriate in two weeks due to patient being able to return to work and meeting all goals at this time.      Patient Education  Safe lifting technique for return to work    Pt verbalized/demonstrated good understanding:     [x] Yes [] No, pt required further clarification. Post Treatment Pain:  0/10      Plan  Times per week: 2-3  Plan weeks: 4      Goals  (Total # of Visits to Date: 10)   Short Term Goals - Time Frame for Short term goals: 3 weeks     Short term goal 1: Pt. to initiate HEP for improved core strength and lumbar mobility- met                                        []Met   []Partially met  []Not met   Short term goal 2: Initiate modalities prn to decrease pain and improve mobility- met  []Met   []Partially met  []Not met   Short term goal 3: Pt. to have improved L hip IR to 45* and be (-)TTP at L piriformis for improved mobility. -MET  []Met   []Partially met  []Not met      []Met   []Partially met  []Not met     Long Term Goals - Time Frame for Long term goals : 6 weeks  Long term goal 1: Pt. to be independent with HEP-met []Met  []Partially met  []Not met   Long term goal 2: Pt. to demo improved core strength >/=4+/5 and improved B hip strength to 5/5 for improved functional strength.  -met []Met  []Partially met  []Not met   Long term goal 3: Pt. to demo improved lumbar AROM: LSB able to reach knee joint line with no pain and flexion able to touch floor with no pain to improve functional mobility-met []Met  []Partially met  []Not met     []Met  []Partially met  []Not met     []Met  []Partially met  []Not met       Minutes Tracking:  Time In: 1300  Time Out: 1340  Minutes: 61 Cesilia Luevano, PT, DPT     Date: 6/13/2019

## 2019-06-18 NOTE — DISCHARGE SUMMARY
Phone: [facility]          Fax: [phone]                            Outpatient Physical Therapy                                                                    Discharge Summary    Patient: Juvenal Montenegro  : 1975  CSN #: [id number]   Referring physician: No admitting provider for patient encounter. Referring Practitioner: Dr. Imelda Engel MD      Diagnosis: Acute midline low back pain with bilateral sciatica, M54.42, M54.41      Date Treatment Initiated: 5/10/19  Date of Last Treatment: 19      PT Visit Information  Onset Date: 19  PT Insurance Information: BCBS  Total # of Visits Approved: 20  Total # of Visits to Date: 10  Plan of Care/Certification Expiration Date: 19  No Show: 0  Canceled Appointment: 0      Frequency/Duration  2-3 times per week  4 weeks      Treatment Received  [x] HP/CP      [x] Electrical Stim   [x] Therapeutic Exercise      [] Gait Training  [] Aquatics   [] Ultrasound         [x] Patient Education/HEP   [x] Manual Therapy  [x] Traction    [x] Neuro-susie        [x] Soft Tissue Mobs            [] Home TENS  [] Iontophoresis    [] Orthotic casting/fitting      [] Dry Needling    Assessment  Assessment: Pt. has met all goals at this time for decreased pain, independence with HEP, improved L hip ROM and trunk ROM and increased core and B hip strength. Will d/c patient at this time due to meeting all goals and being independent with HEP. Goals  Short term goals  Time Frame for Short term goals: 3 weeks  Short term goal 1: Pt. to initiate HEP for improved core strength and lumbar mobility- met  Short term goal 2: Initiate modalities prn to decrease pain and improve mobility- met  Short term goal 3: Pt. to have improved L hip IR to 45* and be (-)TTP at L piriformis for improved mobility. -MET    Long term goals  Time Frame for Long term goals : 6 weeks  Long term goal 1: Pt. to be independent with HEP-met  Long term goal 2: Pt. to demo improved core strength >/=4+/5 and improved B hip strength to 5/5 for improved functional strength.  -met  Long term goal 3: Pt. to demo improved lumbar AROM: LSB able to reach knee joint line with no pain and flexion able to touch floor with no pain to improve functional mobility-met      Reason for Discharge  [x] Goals Achieved                        []  Poor Follow Through/Attendance                  [x]  Optimal Function Achieved     []  Patient Discharged Self    []  Hospitalization                         []  Physician discharge      Thank you for this referral      Terell Saucedo PT, DPT               Date: 6/18/2019